# Patient Record
Sex: FEMALE | NOT HISPANIC OR LATINO | URBAN - METROPOLITAN AREA
[De-identification: names, ages, dates, MRNs, and addresses within clinical notes are randomized per-mention and may not be internally consistent; named-entity substitution may affect disease eponyms.]

---

## 2025-02-11 ENCOUNTER — APPOINTMENT (OUTPATIENT)
Dept: PRIMARY CARE | Facility: CLINIC | Age: 60
End: 2025-02-11

## 2025-02-11 VITALS
BODY MASS INDEX: 20.96 KG/M2 | DIASTOLIC BLOOD PRESSURE: 72 MMHG | SYSTOLIC BLOOD PRESSURE: 104 MMHG | WEIGHT: 125.8 LBS | HEIGHT: 65 IN | HEART RATE: 82 BPM

## 2025-02-11 DIAGNOSIS — M85.80 OSTEOPENIA, UNSPECIFIED LOCATION: ICD-10-CM

## 2025-02-11 DIAGNOSIS — E06.3 HYPOTHYROIDISM DUE TO HASHIMOTO THYROIDITIS: ICD-10-CM

## 2025-02-11 DIAGNOSIS — F41.9 ANXIETY: ICD-10-CM

## 2025-02-11 DIAGNOSIS — E78.5 DYSLIPIDEMIA: Primary | ICD-10-CM

## 2025-02-11 DIAGNOSIS — R73.03 PREDIABETES: ICD-10-CM

## 2025-02-11 DIAGNOSIS — Z78.0 MENOPAUSE PRESENT: ICD-10-CM

## 2025-02-11 DIAGNOSIS — Z13.6 SCREENING FOR HEART DISEASE: ICD-10-CM

## 2025-02-11 PROCEDURE — 99204 OFFICE O/P NEW MOD 45 MIN: CPT | Performed by: STUDENT IN AN ORGANIZED HEALTH CARE EDUCATION/TRAINING PROGRAM

## 2025-02-11 PROCEDURE — 3008F BODY MASS INDEX DOCD: CPT | Performed by: STUDENT IN AN ORGANIZED HEALTH CARE EDUCATION/TRAINING PROGRAM

## 2025-02-11 RX ORDER — SELENIUM 200 MCG
TABLET ORAL DAILY
COMMUNITY

## 2025-02-11 RX ORDER — ESTRADIOL 0.1 MG/G
2 CREAM VAGINAL 2 TIMES WEEKLY
COMMUNITY
Start: 2024-10-22

## 2025-02-11 RX ORDER — NAPROXEN SODIUM 220 MG/1
TABLET ORAL DAILY
COMMUNITY

## 2025-02-11 RX ORDER — MELATONIN 3 MG
3-6 CAPSULE ORAL NIGHTLY
COMMUNITY

## 2025-02-11 RX ORDER — MUPIROCIN CALCIUM 20 MG/G
CREAM TOPICAL AS NEEDED
COMMUNITY

## 2025-02-11 RX ORDER — ZINC GLUCONATE 50 MG
TABLET ORAL DAILY
COMMUNITY

## 2025-02-11 RX ORDER — SERTRALINE HYDROCHLORIDE 50 MG/1
1-2 TABLET, FILM COATED ORAL DAILY PRN
COMMUNITY
Start: 2024-12-20

## 2025-02-11 RX ORDER — LEVOTHYROXINE SODIUM 50 UG/1
50 TABLET ORAL DAILY
COMMUNITY
Start: 2025-01-13

## 2025-02-11 ASSESSMENT — PATIENT HEALTH QUESTIONNAIRE - PHQ9
2. FEELING DOWN, DEPRESSED OR HOPELESS: NOT AT ALL
SUM OF ALL RESPONSES TO PHQ9 QUESTIONS 1 AND 2: 0
1. LITTLE INTEREST OR PLEASURE IN DOING THINGS: NOT AT ALL

## 2025-02-11 NOTE — PROGRESS NOTES
Subjective   Patient ID: Selena Jacobson is a 59 y.o. female who presents for establish care. Previous provider Dr Giordano in [place].     HPI  Hypothyroidism - managed on levothyroxine 50mcg every day, was initially started a few years ago, states that her TSH was historically wnl but started feeling overall unwell after covid vaccinations, states TPO was tested and were elevated, was diagnosed with Hashimoto's, states controlled with diet for some time, started feeling unwell again so was started on 50mcg levothyroxine and has since overall felt well    HLD - unable to tolerate statins in the past, tried 2 different statins and had significant decrease in strength and increase in overall pain, she also tried red yeast rice and had similar reaction, really watches diet, follows mostly gluten-free, dairy-free, no processed sugar diet, is very active at baseline, does some strength training with weights as well as walking up to 2mi/d, she does have fhx CAD in mother    Prediabetes - HbA1c 5.7%, eats a lot of fruit, sweetens with honey, very little added sugar, eats gluten-free bread regularly    Osteopenia - taking vit d/calcium supplement, adding in weight-bearing exercise as above, she might try to introduce yogurt    Anxiety - managed on Zoloft 50mg every day which she has taken for many years, does have dips in mood July-August and February-March annually, she increases to 75-100mg during the dips, does treat rare panic attacks with old prescription of diazepam, can't even remember the last time she took the benzo    Cervical Cancer Screening: Dr. Naranjo ([place]), states utd  Breast Cancer Screening: no fhx, utd with obgyn  Osteoporosis Screening: due  Colon Cancer Screening: no fhx, asymptomatic, Dr. Garcia 12/12/2022  Tobacco: remote hx of social smoking  Alcohol: wine 5x/wk  Recreational Drugs: denies  Immunizations: [id number] today, otherwise utd    Review of Systems   Constitutional:  Negative for  "chills and fever.   HENT:  Negative for congestion and rhinorrhea.    Eyes:  Negative for redness.   Respiratory:  Negative for cough and shortness of breath.    Cardiovascular:  Negative for chest pain, palpitations and leg swelling.   Gastrointestinal:  Negative for abdominal pain, blood in stool, constipation, diarrhea, nausea and vomiting.   Genitourinary:  Negative for dysuria and flank pain.   Musculoskeletal:  Negative for arthralgias and myalgias.   Skin:  Negative for rash.   Neurological:  Negative for dizziness, numbness and headaches.   Psychiatric/Behavioral:  Negative for dysphoric mood and sleep disturbance. The patient is not nervous/anxious.      Objective   /72   Pulse 82   Ht 1.651 m (5' 5\")   Wt 57.1 kg (125 lb 12.8 oz)   BMI 20.93 kg/m²     Physical Exam  Vitals reviewed.   Constitutional:       General: She is not in acute distress.     Appearance: Normal appearance.   HENT:      Head: Normocephalic and atraumatic.   Eyes:      General: No scleral icterus.     Conjunctiva/sclera: Conjunctivae normal.   Neck:      Thyroid: No thyroid mass or thyromegaly.   Cardiovascular:      Rate and Rhythm: Normal rate and regular rhythm.      Heart sounds: No murmur heard.  Pulmonary:      Effort: Pulmonary effort is normal. No respiratory distress.      Breath sounds: Normal breath sounds.   Abdominal:      General: Bowel sounds are normal. There is no distension.      Palpations: Abdomen is soft.      Tenderness: There is no abdominal tenderness. There is no guarding or rebound.   Musculoskeletal:         General: No swelling or deformity.      Cervical back: Normal range of motion and neck supple.   Skin:     General: Skin is warm and dry.      Findings: No rash.   Neurological:      General: No focal deficit present.      Mental Status: She is alert.   Psychiatric:         Mood and Affect: Mood normal.         Behavior: Behavior normal.       Assessment/Plan   Problem List Items Addressed This " Visit             ICD-10-CM    Prediabetes R73.03     Last HbA1c 5.7%. We will continue to monitor. Lifestyle modifications reviewed.         Relevant Orders    Hemoglobin A1C    Osteopenia M85.80     Recommendation for calcium (1200mg every day) and vitamin D (2000iu every day) supplementation and weight-bearing exercise reviewed. She is due for DEXA. Will follow up with result when available.         Hypothyroidism due to Hashimoto thyroiditis E06.3     Recent TSH provided by pt wnl. She is clinically euthyroid. We will continue levothyroxine at current dose and will continue to monitor TSH. Return precautions reviewed.          Relevant Orders    TSH with reflex to Free T4 if abnormal    Follow Up In Primary Care - Established    Dyslipidemia - Primary E78.5     Unable to tolerate statin x1 and red yeast rice in the past due to overall pain and decrease in strength. Using shared decision making, we decided to proceed with CT cardiac scoring for further risk stratification. Will follow up with results when available. Dietary modifications and recommendation for 150 minutes of moderate-intensity exercise per week reviewed.          Relevant Orders    CBC and Auto Differential    Comprehensive Metabolic Panel    Lipid Panel    Follow Up In Primary Care - Established    Anxiety F41.9     Overall managing well on current dose of Zoloft (50mg every day). Typically increases to 75 or 100mg every day during July-August and February-March.          Other Visit Diagnoses         Codes    Menopause present     Z78.0    Relevant Orders    XR DEXA bone density    Follow Up In Primary Care - Established    Screening for heart disease     Z13.6    Relevant Orders    CT cardiac scoring wo IV contrast    Follow Up In Primary Care - Established        Follow up in 6mo for recheck, sooner if needed. Labs prior to appt.

## 2025-02-19 ASSESSMENT — ENCOUNTER SYMPTOMS
NAUSEA: 0
MYALGIAS: 0
PALPITATIONS: 0
COUGH: 0
EYE REDNESS: 0
SLEEP DISTURBANCE: 0
DIZZINESS: 0
NUMBNESS: 0
CHILLS: 0
ARTHRALGIAS: 0
DYSPHORIC MOOD: 0
FLANK PAIN: 0
CONSTIPATION: 0
DIARRHEA: 0
DYSURIA: 0
RHINORRHEA: 0
HEADACHES: 0
FEVER: 0
ABDOMINAL PAIN: 0
NERVOUS/ANXIOUS: 0
SHORTNESS OF BREATH: 0
VOMITING: 0
BLOOD IN STOOL: 0

## 2025-02-19 NOTE — ASSESSMENT & PLAN NOTE
Unable to tolerate statin x1 and red yeast rice in the past due to overall pain and decrease in strength. Using shared decision making, we decided to proceed with CT cardiac scoring for further risk stratification. Will follow up with results when available. Dietary modifications and recommendation for 150 minutes of moderate-intensity exercise per week reviewed.

## 2025-02-19 NOTE — ASSESSMENT & PLAN NOTE
Recent TSH provided by pt wnl. She is clinically euthyroid. We will continue levothyroxine at current dose and will continue to monitor TSH. Return precautions reviewed.

## 2025-02-19 NOTE — ASSESSMENT & PLAN NOTE
Last HbA1c 5.7%. We will continue to monitor. Lifestyle modifications reviewed.   Shade Estrada(Attending)

## 2025-02-19 NOTE — ASSESSMENT & PLAN NOTE
Recommendation for calcium (1200mg every day) and vitamin D (2000iu every day) supplementation and weight-bearing exercise reviewed. She is due for DEXA. Will follow up with result when available.

## 2025-02-19 NOTE — ASSESSMENT & PLAN NOTE
Overall managing well on current dose of Zoloft (50mg every day). Typically increases to 75 or 100mg every day during July-August and February-March.